# Patient Record
(demographics unavailable — no encounter records)

---

## 2025-03-13 NOTE — PHYSICAL EXAM
[No Acute Distress] : no acute distress [Well Nourished] : well nourished [Well Developed] : well developed [PERRL] : pupils equal round and reactive to light [EOMI] : extraocular movements intact [Normal Oropharynx] : the oropharynx was normal [Supple] : supple [No Accessory Muscle Use] : no accessory muscle use [Clear to Auscultation] : lungs were clear to auscultation bilaterally [Regular Rhythm] : with a regular rhythm [Normal S1, S2] : normal S1 and S2 [No Edema] : there was no peripheral edema [Soft] : abdomen soft [No Spinal Tenderness] : no spinal tenderness [Grossly Normal Strength/Tone] : grossly normal strength/tone [Normal Gait] : normal gait [Normal Affect] : the affect was normal [Normal Insight/Judgement] : insight and judgment were intact [de-identified] : TTP right 3rd toe when bending, no effusion, no gout [de-identified] : 2 cm oval dark melancytic lesion on 3rd right metacarpal region without asymetry or color changes

## 2025-03-13 NOTE — HISTORY OF PRESENT ILLNESS
[de-identified] : 52M PMH migraines, anterior cerebral aneurysms, HLD, NUNO, seizure (last episode 9 months ago, presents today for MSK concerns.  1) He chronically has had right 3rd toe pain for last 2 years, but over the last few months he feels a sharp pain when walking at times on that toe. No recent traumas, no stepping on kristofer nails, no effusions, no drainage.  2) He notes intermittent upper back muscle spasms when sleeping last month. No falls, traumas, heavy lifting.  3) He endorses restlessness with his arms and legs feeling numb if he sits for too long or stays in one position for too long. He does endorse moving around a lot when sleeping at night as well.  4) PHQ-2 negative. He mentions he accidentally slipped his clonazepam into the toilet at night about 10-12 days ago, but takes 0.5 mg daily for anxiety, but has not felt any withdrawal symptoms.

## 2025-03-13 NOTE — HISTORY OF PRESENT ILLNESS
[de-identified] : 52M PMH migraines, anterior cerebral aneurysms, HLD, NUNO, seizure (last episode 9 months ago, presents today for MSK concerns.  1) He chronically has had right 3rd toe pain for last 2 years, but over the last few months he feels a sharp pain when walking at times on that toe. No recent traumas, no stepping on kristofer nails, no effusions, no drainage.  2) He notes intermittent upper back muscle spasms when sleeping last month. No falls, traumas, heavy lifting.  3) He endorses restlessness with his arms and legs feeling numb if he sits for too long or stays in one position for too long. He does endorse moving around a lot when sleeping at night as well.  4) PHQ-2 negative. He mentions he accidentally slipped his clonazepam into the toilet at night about 10-12 days ago, but takes 0.5 mg daily for anxiety, but has not felt any withdrawal symptoms.

## 2025-03-13 NOTE — END OF VISIT
[] : Resident [FreeTextEntry3] : Patient is here with 3 concerns: Pain in the toe.  Patient has pain in the fourth toe of the right foot.  There appears to be tenderness over the extensor tendon of that but no pain on range of motion.  Will refer to podiatry for consideration of orthotics.  Patient describes back spasms only at night between his scapula.  Will refer to physical therapy and we discussed the importance of stretching these muscles before bedtime.  Also patient states when he sits for a prolonged period of time he feels numbness in his hands and feet and feels the need to get up and move.  Once moving to the discomfort resolves.  Will measure B12, ferritin and TSH and asked patient to discuss with his neurologist at their next visit which is coming up soon.  Finally, patient states he lost 12 clonazepam pills which he takes for anxiety when they spilled into the toilet during the night.  This was 12 days ago.  He is getting by without them and will discuss the need for refill with his neurologist.

## 2025-03-13 NOTE — PHYSICAL EXAM
[No Acute Distress] : no acute distress [Well Nourished] : well nourished [Well Developed] : well developed [PERRL] : pupils equal round and reactive to light [EOMI] : extraocular movements intact [Normal Oropharynx] : the oropharynx was normal [Supple] : supple [No Accessory Muscle Use] : no accessory muscle use [Clear to Auscultation] : lungs were clear to auscultation bilaterally [Regular Rhythm] : with a regular rhythm [Normal S1, S2] : normal S1 and S2 [No Edema] : there was no peripheral edema [Soft] : abdomen soft [No Spinal Tenderness] : no spinal tenderness [Grossly Normal Strength/Tone] : grossly normal strength/tone [Normal Gait] : normal gait [Normal Affect] : the affect was normal [Normal Insight/Judgement] : insight and judgment were intact [de-identified] : TTP right 3rd toe when bending, no effusion, no gout [de-identified] : 2 cm oval dark melancytic lesion on 3rd right metacarpal region without asymetry or color changes

## 2025-03-13 NOTE — REVIEW OF SYSTEMS
[Back Pain] : back pain [Fever] : no fever [Chills] : no chills [Vision Problems] : no vision problems [Chest Pain] : no chest pain [Palpitations] : no palpitations [Shortness Of Breath] : no shortness of breath [Abdominal Pain] : no abdominal pain [Mole Changes] : no mole changes [Anxiety] : no anxiety [Depression] : no depression

## 2025-03-13 NOTE — PLAN
[FreeTextEntry1] : 52M PMH migraines, anterior cerebral aneurysms, HLD, NUNO, seizure (last episode 9 months ago, presents today for MSK concerns.  #Tendonitis Patient presents with acute on chronic right 3rd toe pain, which is worse when walking now with a sharp pain. On exam, elicits pain c/f tendonitis. Unlikely gout, arthritis at this time. Without effusion, protuberance, erythema. -Podiatry referral  #Melancoytic mole Patient p/w chronic 2 cm melanocytic mole on right 3rd metacarpal. He had biopsy 8 years ago with dermatology, mole grew back per patient. No asymmetry or changes in color -Dermatology referral for monitoring and repeat biopsy possibly  #Upper back muscle spasms Patient p/w 1 month of upper back intermittent muscle spasms, worse at night. No TTP on exam, possibly MSK in origin. No heavy lifting recently or trauma. -PT referral  #Restlessness #Neuropathy Patient endorses feeling restless when sitting still for too long with arms/legs sometimes feeling they are falling asleep, relieved by movement. At night, moves around a lot. Etiology possibly restlessness leg syndrome -Lab work today: CMP (r/o electrolyte abnormalities), CBC (r/o anemia), ferritin, TSH w/ reflex T4, folic acid, B12 (r/o vitamin deficiencies) -Follow up with Neurology Dr. Richardson on 3/20/24 for neuropathy   #Anxiety Hx of anxiety, on home med clonezepam 0.5 mg daily. Prescribed by Dr. Richardson (neurology). Patient states dropped remaining pills accidentally into toilet 10-12 days ago, no withdrawal symptoms.  -Appointment with prescriber Dr. Richardson on 3/20/25, patient states will drop by office today after this visit for refill  RTC for CPE

## 2025-03-20 NOTE — PROCEDURE
[FreeTextEntry3] : The areas to be injected were cleaned with alcohol swabs and allowed to dry prior to injection.  Botox 200u [address]-46 [id number], Exp 06/27 mixed with 4cc saline [id number] Jan-31-26  per standard procedure indicated above with these exceptions: Pt deferred both corugator injections  Total Used:  145 Total Wasted:  55  The patient tolerated the procedure without issues.

## 2025-05-01 NOTE — PHYSICAL EXAM
[Alert] : alert [Oriented x 3] : ~L oriented x 3 [FreeTextEntry3] : R dorsal foot with brown papule and adjacent scar

## 2025-05-01 NOTE — ASSESSMENT
[FreeTextEntry1] : #Nevus - R dorsal foot per pt, present since childhood and was biopsied 10 years ago - was told it was normal likely a congenital or blue nevus with concomitant scar we reviewed the difficulty in clinical monitoring of a lesion once it has been biopsied or excised there are no concerning features on exam today and it has been stable per patient continue to monitor and rtc if any changes- I doubt the skin lesion is related to his underlying foot pain, but per the patient he was sent to dermatology for evaluation as it was thought it may be related  #R foot pain XR ordered will need to f/up with PCP/ortho pending results  RTC PRN

## 2025-05-01 NOTE — HISTORY OF PRESENT ILLNESS
[FreeTextEntry1] : RP - Mole on right foot [de-identified] : Derick Crow 51y/o M presents for mole on rt foot. LV: 09/07/22 Seen by Dr. Cook #pain in 3rd digit rt foot touching and flexing the foot causes sharp pain started a few years ago also has dry skin on the feet  had a biopsy of a mole on the R dorsal foot about 10 years ago, was told it was normal this mole has been present since childhood  PHx of skin cancer: no FHx of skin cancer: no H/x of blistering sunburns: yes H/x of tanning bed use: yes Uses sunscreen regularly: no

## 2025-06-26 NOTE — PROCEDURE
[Chronic migraine] : Chronic migraine [Consent Signed] : consent signed [Adverse Effects] : no adverse effects [Continue Current Treatment] : continue current treatment [BOTOX 200 Units] : BOTOX 200 units; 5 units per muscle site.  procerus x 1, corragator x 2, frontalis x 4, temporalis x 8, occipitalis x 6, cervical  paraspinal x 4 and trapezius x 6 [FreeTextEntry1] : Informed consent obtained for use of botox. This includes injection site pain, infection, bruising, hematoma and local allergic reaction. Allergies, including systemic reactions. Muscle weakness/eyelid or brow drooping.   Muscle atrophy.  The areas to be injected were cleaned with alcohol swabs and allowed to dry prior to injection. An anesthetic spray of Ethyl Chloride ([employer])  Lot#[id number], Exp 10/31/25 was used to desensitize the skin prior to injections.  Botox 200u [address]-72 [id number] 11/2027 mixed with 4cc saline   per standard procedure indicated above with these exceptions:  frontally distributed  2.5 x 4 along mid-forehead, 7.5 x 4 upper forehead  The patient tolerated the procedure without issues.